# Patient Record
Sex: MALE | Race: WHITE | URBAN - METROPOLITAN AREA
[De-identification: names, ages, dates, MRNs, and addresses within clinical notes are randomized per-mention and may not be internally consistent; named-entity substitution may affect disease eponyms.]

---

## 2018-01-22 ENCOUNTER — MEDICAL CORRESPONDENCE (OUTPATIENT)
Dept: HEALTH INFORMATION MANAGEMENT | Facility: CLINIC | Age: 23
End: 2018-01-22

## 2018-01-22 ENCOUNTER — TRANSFERRED RECORDS (OUTPATIENT)
Dept: HEALTH INFORMATION MANAGEMENT | Facility: CLINIC | Age: 23
End: 2018-01-22

## 2018-01-24 DIAGNOSIS — J45.909 ASTHMA: Primary | ICD-10-CM

## 2018-02-22 NOTE — TELEPHONE ENCOUNTER
APPT INFO    Date /Time: 3/1/18, 10:10am    Reason for Appt: Asthma and cough   Ref Provider/Clinic: PERLA GUADARRAMA   Are there internal records? Yes/No?  IF YES, list clinic names: No    Are there outside records? Yes/No? [facility]- records scanned into epic.   Patient Contact (Y/N) & Call Details: No, referred    Action:

## 2018-02-25 ENCOUNTER — HEALTH MAINTENANCE LETTER (OUTPATIENT)
Age: 23
End: 2018-02-25

## 2018-02-26 ASSESSMENT — ENCOUNTER SYMPTOMS
WHEEZING: 1
SNORES LOUDLY: 0
SHORTNESS OF BREATH: 1
POSTURAL DYSPNEA: 1
COUGH DISTURBING SLEEP: 1
SPUTUM PRODUCTION: 1
DYSPNEA ON EXERTION: 1
COUGH: 1
HEMOPTYSIS: 0

## 2018-03-01 ENCOUNTER — PRE VISIT (OUTPATIENT)
Dept: PULMONOLOGY | Facility: CLINIC | Age: 23
End: 2018-03-01

## 2018-03-01 ENCOUNTER — OFFICE VISIT (OUTPATIENT)
Dept: PULMONOLOGY | Facility: CLINIC | Age: 23
End: 2018-03-01
Payer: COMMERCIAL

## 2018-03-01 ENCOUNTER — RADIANT APPOINTMENT (OUTPATIENT)
Dept: GENERAL RADIOLOGY | Facility: CLINIC | Age: 23
End: 2018-03-01
Payer: COMMERCIAL

## 2018-03-01 VITALS
DIASTOLIC BLOOD PRESSURE: 78 MMHG | HEIGHT: 73 IN | WEIGHT: 179 LBS | HEART RATE: 65 BPM | BODY MASS INDEX: 23.72 KG/M2 | RESPIRATION RATE: 18 BRPM | SYSTOLIC BLOOD PRESSURE: 135 MMHG | OXYGEN SATURATION: 98 %

## 2018-03-01 DIAGNOSIS — J45.909 ASTHMA: ICD-10-CM

## 2018-03-01 DIAGNOSIS — R05.9 COUGH: Primary | ICD-10-CM

## 2018-03-01 PROCEDURE — G0463 HOSPITAL OUTPT CLINIC VISIT: HCPCS | Mod: ZF

## 2018-03-01 RX ORDER — AZITHROMYCIN 250 MG/1
250 TABLET, FILM COATED ORAL DAILY
Qty: 6 TABLET | Refills: 0 | Status: SHIPPED | OUTPATIENT
Start: 2018-03-01 | End: 2020-09-03

## 2018-03-01 RX ORDER — CETIRIZINE HYDROCHLORIDE 10 MG/1
10 TABLET ORAL DAILY
COMMUNITY

## 2018-03-01 RX ORDER — HYDROXYZINE HYDROCHLORIDE 10 MG/5ML
4 SYRUP ORAL EVERY 6 HOURS PRN
Qty: 60 TABLET | Refills: 0 | Status: SHIPPED | OUTPATIENT
Start: 2018-03-01

## 2018-03-01 ASSESSMENT — PAIN SCALES - GENERAL: PAINLEVEL: NO PAIN (0)

## 2018-03-01 NOTE — PROGRESS NOTES
[facility]  Pulmonary Medicine  Visit Clinic Note  March 1, 2018         ASSESSMENT & PLAN       Cough: I suspect that Mr. Diop's cough is related to a post viral bronchitis.  It certainly does not seem to be related to asthma.  He is failed treatment with Advair, prednisone, and his as needed albuterol inhaler.  Additionally his spirometry in the setting of having symptoms does not show any signs of airflow obstruction.    I suspect that time is going to be the best remedy for his cough.  I understand this is hard to bear, especially with the symptom burden that he is experiencing right now.  I did try to reassure him that I do not see any significant red flags that represents dangerous pathology.    In the setting, there are not great evidence-based algorithms as far as how to treat cough.  Sometimes it can be success with the first generation antihistamine such as chlorpheniramine.  I will start treatment with this at 4 mg.  He can take a dose at night, and if he does not find that it is too sedating he can take it during the day as well.  He can give us about a 1 week trial period to see if this helps out with his symptoms.  If it does not help, he could stop this medication.  I have also given him another 5 day course of azithromycin.  He can take this if the chlorpheniramine fails.  The purpose would be to use anti-inflammatory properties and see if it can help with any of his symptoms.    If these above 2 measures do not help, then we can give a trial of gabapentin    Joseph Borja MD     I spent a total of 60 minutes face-to-face with Percy Diop during today's office visit.  Over 50% of this time was spent counseling the patient and/or coordinating care regarding his cough, possible reasons for it, and potential therapeutic avenues.  See note for details.         Today's visit note:     Chief Complaint: Percy Diop is a 22 year old year old male who is being seen for Consult  (Asthma and cough)      HISTORY OF PRESENT ILLNESS:  This is a 22-year-old male with a history of asthma who is presenting to the pulmonary clinic today for evaluation of symptoms after a viral infection.     he gets sick in the second week of January with a viral infection.  This consisted of sore throat chest congestion and cough.  He is now left with some prolonged symptoms that he has not been able to shake.  He has been to see his primary care physician has tried multiple different medications to no avail.  These medications include 2 rounds of antibiotics.  One was azithromycin and the other one was Keflex.  He is also been put on Advair twice a day dosing, and had a trial of prednisone.  None of these medications have helped.    He has had these symptoms on a yearly basis now, always coming after a viral infection.  However in the past the symptoms seem to hernan after taking antibiotics.  He also sometimes gets these symptoms right around Thanksgiving time.  The symptoms that he is experiencing right now as a sensation of shortness of breath, where he just feels he cannot take a deep enough breath to fill his lungs up with air.  He also feels a warm sensation in his lungs and has a dry cough.  He always feels on the verge of an asthma attack.    He states he was diagnosed with asthma since he was a little kid.  He went into remission where he did not need any controlling medication and rarely used a rescue inhaler.  Prior to this recent illness he was essentially just using an albuterol inhaler prior to exercise, because that is the only time that he would get symptoms.  He been on Advair in the past, however the co-pays became too high and so then he tried arnuity ellipta.  However he stopped this medication because it was not helping out with any of his symptoms.    Talking seems to exacerbate his symptoms of cough and shortness of breath.  The only thing that is helped his taking cough drops.              "Past Medical and Surgical History:     Past Medical History:   Diagnosis Date     Asthma      Past Surgical History:   Procedure Laterality Date     TONSILLECTOMY             Family History:     Family History   Problem Relation Age of Onset     Coronary Artery Disease Maternal Grandfather               Social History:     Social History     Social History     Marital status: Single     Spouse name: N/A     Number of children: N/A     Years of education: N/A     Occupational History     Not on file.     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             Medications:     Current Outpatient Prescriptions   Medication     fluticasone-salmeterol (ADVAIR DISKUS) 250-50 MCG/DOSE diskus inhaler     Albuterol Sulfate (PROAIR HFA IN)     cetirizine (ZYRTEC) 10 MG tablet     No current facility-administered medications for this visit.             Review of Systems:        Answers for HPI/ROS submitted by the patient on 2/26/2018   General Symptoms: No  Skin Symptoms: No  HENT Symptoms: No  EYE SYMPTOMS: No  HEART SYMPTOMS: No  LUNG SYMPTOMS: Yes  INTESTINAL SYMPTOMS: No  URINARY SYMPTOMS: No  REPRODUCTIVE SYMPTOMS: No  SKELETAL SYMPTOMS: No  BLOOD SYMPTOMS: No  NERVOUS SYSTEM SYMPTOMS: No  MENTAL HEALTH SYMPTOMS: No  Cough: Yes  Sputum or phlegm: Yes  Coughing up blood: No  Difficulty breating or shortness of breath: Yes  Snoring: No  Wheezing: Yes  Difficulty breathing on exertion: Yes  Nighttime Cough: Yes  Difficulty breathing when lying flat: Yes        PHYSICAL EXAM:  /78  Pulse 65  Resp 18  Ht 1.854 m (6' 1\")  Wt 81.2 kg (179 lb)  SpO2 98%  BMI 23.62 kg/m2     General: Well developed, well nourished, anxious  Eyes: Anicteric  Nose: Nasal mucosa with no edema " or hyperemia.  No polyps  Ears: Hearing grossly normal  Mouth: Oral mucosa is moist, without any lesions. No oropharyngeal exudate.  Neck: supple, no thyromegaly  Lymphatics: No cervical or supraclavicular nodes  Respiratory: Good air movement. No crackles. No rhonchi. No wheezes  Cardiac: RRR, normal S1, S2. No murmurs. No JVD  Abdomen: Soft, NT/ND  Musculoskeletal: Extremities normal. No clubbing. No cyanosis. No edema.  Skin: No rash on limited exam  Neuro: Normal mentation. Normal speech.  Psych:Normal affect           Data:   All laboratory and imaging data reviewed.      PFT:   Pulmonary function testing: He was not able to exhale for a full 6 seconds.  However that said, his spirometry has values of the peers supranormal for his demographic.  His FEV1-FVC ratio is 97%.  His FEV1 is 5.84 L which is 115% predicted, and his FVC is 6.02 L which is 100% predicted.  His total lung capacity 7.46 L which is 97% predicted.  His DLCO is 82% predicted.    Based on these results, it does not appear that he has airflow obstruction.  I do not imagine that if his vital capacity was a little bit higher with more seconds of exhalation it would get to a point where it meets criteria for airflow obstruction.    PFT Interpretation:  Normal spirometry.  Valid Maneuver    CXR: I have independently reviewed the chest x-ray and agree with the radiologist results below:  Findings: PA and lateral views of the chest. Cardiac size within  normal limits. Pulmonary vasculature is distinct. No pleural effusion  or pneumothorax. No focal pulmonary opacities. No acute bony  abnormalities. The visualized upper abdomen appears unremarkable.         Impression:   No acute cardiopulmonary abnormalities.    Recent Results (from the past 168 hour(s))   General PFT Lab (Please always keep checked)    Collection Time: 03/01/18  8:41 AM   Result Value Ref Range    FVC-Pred 6.01 L    FVC-Pre 6.02 L    FVC-%Pred-Pre 100 %    FEV1-Pre 5.84 L     FEV1-%Pred-Pre 115 %    FEV1FVC-Pred 84 %    FEV1FVC-Pre 97 %    FEFMax-Pred 10.69 L/sec    FEFMax-Pre 11.54 L/sec    FEFMax-%Pred-Pre 107 %    FEF2575-Pred 5.34 L/sec    FEF2575-Pre 7.58 L/sec    RGA4399-%Pred-Pre 141 %    ExpTime-Pre 1.25 sec    FIFMax-Pre 7.61 L/sec    VC-Pred 6.32 L    VC-Pre 6.10 L    VC-%Pred-Pre 96 %    IC-Pred 4.17 L    IC-Pre 3.59 L    IC-%Pred-Pre 86 %    ERV-Pred 2.15 L    ERV-Pre 2.52 L    ERV-%Pred-Pre 117 %    FEV1FEV6-Pred 84 %    FEV1FEV6-Pre 96 %    FRCPleth-Pred 3.44 L    FRCPleth-Pre 3.87 L    FRCPleth-%Pred-Pre 112 %    RVPleth-Pred 1.73 L    RVPleth-Pre 1.36 L    RVPleth-%Pred-Pre 78 %    TLCPleth-Pred 7.63 L    TLCPleth-Pre 7.46 L    TLCPleth-%Pred-Pre 97 %    DLCOunc-Pred 38.35 ml/min/mmHg    DLCOunc-Pre 31.75 ml/min/mmHg    DLCOunc-%Pred-Pre 82 %    VA-Pre 6.87 L    VA-%Pred-Pre 94 %    FEV1SVC-Pred 80 %    FEV1SVC-Pre 96 %

## 2018-03-01 NOTE — MR AVS SNAPSHOT
"              After Visit Summary   3/1/2018    Percy Diop    MRN: [id number]           Patient Information     Date Of Birth          1995        Visit Information        Provider Department      3/1/2018 10:10 AM Thiago Borja MD [facility]        Today's Diagnoses     Cough    -  1       Follow-ups after your visit        Who to contact     If you have questions or need follow up information about today's clinic visit or your schedule please contact [facility] directly at [phone].  Normal or non-critical lab and imaging results will be communicated to you by [employer], letter or phone within 4 business days after the clinic has received the results. If you do not hear from us within 7 days, please contact the clinic through [employer] or phone. If you have a critical or abnormal lab result, we will notify you by phone as soon as possible.  Submit refill requests through [employer] or call your pharmacy and they will forward the refill request to us. Please allow 3 business days for your refill to be completed.          Additional Information About Your Visit        MyChart Information     [employer] gives you secure access to your electronic health record. If you see a primary care provider, you can also send messages to your care team and make appointments. If you have questions, please call your primary care clinic.  If you do not have a primary care provider, please call [phone] and they will assist you.        Care EveryWhere ID     This is your Care EveryWhere ID. This could be used by other organizations to access your [place] medical records  [id number]        Your Vitals Were     Pulse Respirations Height Pulse Oximetry BMI (Body Mass Index)       65 18 1.854 m (6' 1\") 98% 23.62 kg/m2        Blood Pressure from Last 3 Encounters:   03/01/18 135/78    Weight from Last 3 Encounters:   03/01/18 81.2 kg (179 lb)            "   Today, you had the following     No orders found for display         Today's Medication Changes          These changes are accurate as of 3/1/18 10:33 AM.  If you have any questions, ask your nurse or doctor.               Start taking these medicines.        Dose/Directions    azithromycin 250 MG tablet   Commonly known as:  ZITHROMAX   Used for:  Cough   Started by:  Thiago Borja MD        Dose:  250 mg   Take 1 tablet (250 mg) by mouth daily   Quantity:  6 tablet   Refills:  0       chlorpheniramine 4 MG tablet   Commonly known as:  CHLOR-TRIMETON   Used for:  Cough   Started by:  Thiago Borja MD        Dose:  4 mg   Take 1 tablet (4 mg) by mouth every 6 hours as needed for other (cough)   Quantity:  60 tablet   Refills:  0            Where to get your medicines      These medications were sent to [place] [address]     Phone:  [phone]     azithromycin 250 MG tablet    chlorpheniramine 4 MG tablet                Primary Care Provider Fax #    [facility] [phone]       [address] [place] [id number]        Equal Access to Services     LARRY ALFREDO : Hadshira Amaro, walilibethda kenneth, qaybta kaalregina rene, mayco rose. So [facility] [phone].    ATENCIÓN: Si habla español, tiene a beck disposición servicios gratuitos de asistencia lingüística. Gina al [phone].    We comply with applicable federal civil rights laws and [place] laws. We do not discriminate on the basis of race, color, national origin, age, disability, sex, sexual orientation, or gender identity.            Thank you!     Thank you for choosing [facility]  for your care. Our goal is always to provide you with excellent care. Hearing back from our patients is one way we can continue to improve our services. Please take a few  minutes to complete the written survey that you may receive in the mail after your visit with us. Thank you!             Your Updated Medication List - Protect others around you: Learn how to safely use, store and throw away your medicines at www.disposemymeds.org.          This list is accurate as of 3/1/18 10:33 AM.  Always use your most recent med list.                   Brand Name Dispense Instructions for use Diagnosis    ADVAIR DISKUS 250-50 MCG/DOSE diskus inhaler   Generic drug:  fluticasone-salmeterol           azithromycin 250 MG tablet    ZITHROMAX    6 tablet    Take 1 tablet (250 mg) by mouth daily    Cough       cetirizine 10 MG tablet    zyrTEC     Take 10 mg by mouth daily        chlorpheniramine 4 MG tablet    CHLOR-TRIMETON    60 tablet    Take 1 tablet (4 mg) by mouth every 6 hours as needed for other (cough)    Cough       PROAIR HFA IN

## 2018-03-01 NOTE — LETTER
3/1/2018       RE: Percy Diop  [address]    [place] [address]     Dear Colleague,    Thank you for referring your patient, Percy Diop, to the [facility] at [facility]. Please see a copy of my visit note below.    [facility]  Pulmonary Medicine  Visit Clinic Note  March 1, 2018         ASSESSMENT & PLAN       Cough: I suspect that Mr. Diop's cough is related to a post viral bronchitis.  It certainly does not seem to be related to asthma.  He is failed treatment with Advair, prednisone, and his as needed albuterol inhaler.  Additionally his spirometry in the setting of having symptoms does not show any signs of airflow obstruction.    I suspect that time is going to be the best remedy for his cough.  I understand this is hard to bear, especially with the symptom burden that he is experiencing right now.  I did try to reassure him that I do not see any significant red flags that represents dangerous pathology.    In the setting, there are not great evidence-based algorithms as far as how to treat cough.  Sometimes it can be success with the first generation antihistamine such as chlorpheniramine.  I will start treatment with this at 4 mg.  He can take a dose at night, and if he does not find that it is too sedating he can take it during the day as well.  He can give us about a 1 week trial period to see if this helps out with his symptoms.  If it does not help, he could stop this medication.  I have also given him another 5 day course of azithromycin.  He can take this if the chlorpheniramine fails.  The purpose would be to use anti-inflammatory properties and see if it can help with any of his symptoms.    If these above 2 measures do not help, then we can give a trial of gabapentin    Joseph Borja MD     I spent a total of 60 minutes face-to-face with Percy Diop during today's office visit.  Over  50% of this time was spent counseling the patient and/or coordinating care regarding his cough, possible reasons for it, and potential therapeutic avenues.  See note for details.         Today's visit note:     Chief Complaint: Percy Diop is a 22 year old year old male who is being seen for Consult (Asthma and cough)      HISTORY OF PRESENT ILLNESS:  This is a 22-year-old male with a history of asthma who is presenting to the pulmonary clinic today for evaluation of symptoms after a viral infection.     he gets sick in the second week of January with a viral infection.  This consisted of sore throat chest congestion and cough.  He is now left with some prolonged symptoms that he has not been able to shake.  He has been to see his primary care physician has tried multiple different medications to no avail.  These medications include 2 rounds of antibiotics.  One was azithromycin and the other one was Keflex.  He is also been put on Advair twice a day dosing, and had a trial of prednisone.  None of these medications have helped.    He has had these symptoms on a yearly basis now, always coming after a viral infection.  However in the past the symptoms seem to hernan after taking antibiotics.  He also sometimes gets these symptoms right around Thanksgiving time.  The symptoms that he is experiencing right now as a sensation of shortness of breath, where he just feels he cannot take a deep enough breath to fill his lungs up with air.  He also feels a warm sensation in his lungs and has a dry cough.  He always feels on the verge of an asthma attack.    He states he was diagnosed with asthma since he was a little kid.  He went into remission where he did not need any controlling medication and rarely used a rescue inhaler.  Prior to this recent illness he was essentially just using an albuterol inhaler prior to exercise, because that is the only time that he would get symptoms.  He been on Advair in the past, however  the co-pays became too high and so then he tried arnuity ellipta.  However he stopped this medication because it was not helping out with any of his symptoms.    Talking seems to exacerbate his symptoms of cough and shortness of breath.  The only thing that is helped his taking cough drops.             Past Medical and Surgical History:     Past Medical History:   Diagnosis Date     Asthma      Past Surgical History:   Procedure Laterality Date     TONSILLECTOMY             Family History:     Family History   Problem Relation Age of Onset     Coronary Artery Disease Maternal Grandfather               Social History:     Social History     Social History     Marital status: Single     Spouse name: N/A     Number of children: N/A     Years of education: N/A     Occupational History     Not on file.     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             Medications:     Current Outpatient Prescriptions   Medication     fluticasone-salmeterol (ADVAIR DISKUS) 250-50 MCG/DOSE diskus inhaler     Albuterol Sulfate (PROAIR HFA IN)     cetirizine (ZYRTEC) 10 MG tablet     No current facility-administered medications for this visit.             Review of Systems:        Answers for HPI/ROS submitted by the patient on 2/26/2018   General Symptoms: No  Skin Symptoms: No  HENT Symptoms: No  EYE SYMPTOMS: No  HEART SYMPTOMS: No  LUNG SYMPTOMS: Yes  INTESTINAL SYMPTOMS: No  URINARY SYMPTOMS: No  REPRODUCTIVE SYMPTOMS: No  SKELETAL SYMPTOMS: No  BLOOD SYMPTOMS: No  NERVOUS SYSTEM SYMPTOMS: No  MENTAL HEALTH SYMPTOMS: No  Cough: Yes  Sputum or phlegm: Yes  Coughing up blood: No  Difficulty breating or shortness of breath: Yes  Snoring: No  Wheezing: Yes  Difficulty breathing on  "exertion: Yes  Nighttime Cough: Yes  Difficulty breathing when lying flat: Yes        PHYSICAL EXAM:  /78  Pulse 65  Resp 18  Ht 1.854 m (6' 1\")  Wt 81.2 kg (179 lb)  SpO2 98%  BMI 23.62 kg/m2     General: Well developed, well nourished, anxious  Eyes: Anicteric  Nose: Nasal mucosa with no edema or hyperemia.  No polyps  Ears: Hearing grossly normal  Mouth: Oral mucosa is moist, without any lesions. No oropharyngeal exudate.  Neck: supple, no thyromegaly  Lymphatics: No cervical or supraclavicular nodes  Respiratory: Good air movement. No crackles. No rhonchi. No wheezes  Cardiac: RRR, normal S1, S2. No murmurs. No JVD  Abdomen: Soft, NT/ND  Musculoskeletal: Extremities normal. No clubbing. No cyanosis. No edema.  Skin: No rash on limited exam  Neuro: Normal mentation. Normal speech.  Psych:Normal affect           Data:   All laboratory and imaging data reviewed.      PFT:   Pulmonary function testing: He was not able to exhale for a full 6 seconds.  However that said, his spirometry has values of the peers supranormal for his demographic.  His FEV1-FVC ratio is 97%.  His FEV1 is 5.84 L which is 115% predicted, and his FVC is 6.02 L which is 100% predicted.  His total lung capacity 7.46 L which is 97% predicted.  His DLCO is 82% predicted.    Based on these results, it does not appear that he has airflow obstruction.  I do not imagine that if his vital capacity was a little bit higher with more seconds of exhalation it would get to a point where it meets criteria for airflow obstruction.    PFT Interpretation:  Normal spirometry.  Valid Maneuver    CXR: I have independently reviewed the chest x-ray and agree with the radiologist results below:  Findings: PA and lateral views of the chest. Cardiac size within  normal limits. Pulmonary vasculature is distinct. No pleural effusion  or pneumothorax. No focal pulmonary opacities. No acute bony  abnormalities. The visualized upper abdomen appears " unremarkable.         Impression:   No acute cardiopulmonary abnormalities.    Recent Results (from the past 168 hour(s))   General PFT Lab (Please always keep checked)    Collection Time: 03/01/18  8:41 AM   Result Value Ref Range    FVC-Pred 6.01 L    FVC-Pre 6.02 L    FVC-%Pred-Pre 100 %    FEV1-Pre 5.84 L    FEV1-%Pred-Pre 115 %    FEV1FVC-Pred 84 %    FEV1FVC-Pre 97 %    FEFMax-Pred 10.69 L/sec    FEFMax-Pre 11.54 L/sec    FEFMax-%Pred-Pre 107 %    FEF2575-Pred 5.34 L/sec    FEF2575-Pre 7.58 L/sec    [id number]-%Pred-Pre 141 %    ExpTime-Pre 1.25 sec    FIFMax-Pre 7.61 L/sec    VC-Pred 6.32 L    VC-Pre 6.10 L    VC-%Pred-Pre 96 %    IC-Pred 4.17 L    IC-Pre 3.59 L    IC-%Pred-Pre 86 %    ERV-Pred 2.15 L    ERV-Pre 2.52 L    ERV-%Pred-Pre 117 %    FEV1FEV6-Pred 84 %    FEV1FEV6-Pre 96 %    FRCPleth-Pred 3.44 L    FRCPleth-Pre 3.87 L    FRCPleth-%Pred-Pre 112 %    RVPleth-Pred 1.73 L    RVPleth-Pre 1.36 L    RVPleth-%Pred-Pre 78 %    TLCPleth-Pred 7.63 L    TLCPleth-Pre 7.46 L    TLCPleth-%Pred-Pre 97 %    DLCOunc-Pred 38.35 ml/min/mmHg    DLCOunc-Pre 31.75 ml/min/mmHg    DLCOunc-%Pred-Pre 82 %    VA-Pre 6.87 L    VA-%Pred-Pre 94 %    FEV1SVC-Pred 80 %    FEV1SVC-Pre 96 %     Sincerely,    Thiago Borja MD

## 2018-03-07 LAB
DLCOUNC-%PRED-PRE: 82 %
DLCOUNC-PRE: 31.75 ML/MIN/MMHG
DLCOUNC-PRED: 38.35 ML/MIN/MMHG
ERV-%PRED-PRE: 117 %
ERV-PRE: 2.52 L
ERV-PRED: 2.15 L
EXPTIME-PRE: 1.25 SEC
FEF2575-%PRED-PRE: 141 %
FEF2575-PRE: 7.58 L/SEC
FEF2575-PRED: 5.34 L/SEC
FEFMAX-%PRED-PRE: 107 %
FEFMAX-PRE: 11.54 L/SEC
FEFMAX-PRED: 10.69 L/SEC
FEV1-%PRED-PRE: 115 %
FEV1-PRE: 5.84 L
FEV1FEV6-PRE: 96 %
FEV1FEV6-PRED: 84 %
FEV1FVC-PRE: 97 %
FEV1FVC-PRED: 84 %
FEV1SVC-PRE: 96 %
FEV1SVC-PRED: 80 %
FIFMAX-PRE: 7.61 L/SEC
FRCPLETH-%PRED-PRE: 112 %
FRCPLETH-PRE: 3.87 L
FRCPLETH-PRED: 3.44 L
FVC-%PRED-PRE: 100 %
FVC-PRE: 6.02 L
FVC-PRED: 6.01 L
IC-%PRED-PRE: 86 %
IC-PRE: 3.59 L
IC-PRED: 4.17 L
RVPLETH-%PRED-PRE: 78 %
RVPLETH-PRE: 1.36 L
RVPLETH-PRED: 1.73 L
TLCPLETH-%PRED-PRE: 97 %
TLCPLETH-PRE: 7.46 L
TLCPLETH-PRED: 7.63 L
VA-%PRED-PRE: 94 %
VA-PRE: 6.87 L
VC-%PRED-PRE: 96 %
VC-PRE: 6.1 L
VC-PRED: 6.32 L

## 2020-03-11 ENCOUNTER — HEALTH MAINTENANCE LETTER (OUTPATIENT)
Age: 25
End: 2020-03-11

## 2020-09-03 ENCOUNTER — VIRTUAL VISIT (OUTPATIENT)
Dept: PULMONOLOGY | Facility: CLINIC | Age: 25
End: 2020-09-03
Payer: COMMERCIAL

## 2020-09-03 DIAGNOSIS — J30.2 SEASONAL ALLERGIC RHINITIS, UNSPECIFIED TRIGGER: Primary | ICD-10-CM

## 2020-09-03 NOTE — PROGRESS NOTES
"Percy Diop is a 24 year old male who is being evaluated via a billable video visit.      The patient has been notified of following:     \"This video visit will be conducted via a call between you and your physician/provider. We have found that certain health care needs can be provided without the need for an in-person physical exam.  This service lets us provide the care you need with a video conversation.  If a prescription is necessary we can send it directly to your pharmacy.  If lab work is needed we can place an order for that and you can then stop by our lab to have the test done at a later time.    Video visits are billed at different rates depending on your insurance coverage.  Please reach out to your insurance provider with any questions.    If during the course of the call the physician/provider feels a video visit is not appropriate, you will not be charged for this service.\"    Patient has given verbal consent for Video visit? Yes  How would you like to obtain your AVS? Chandahart  If you are dropped from the video visit, the video invite should be resent to: Other e-mail: Lynn  Will anyone else be joining your video visit? No      [facility]  Pulmonary Medicine  Visit Clinic Note  September 3, 2020         ASSESSMENT & PLAN       Seasonal allergies: It seems that once a year he develops upper respiratory tract symptoms.  This usually happens in the fall.  It is associated with shortness of breath at times, which does bring the possibility of asthma and to play.  However we have not been able to demonstrate any bronchial hyperresponsiveness.  Perhaps we need to check his spirometry while he is actually symptomatic in order to formally diagnose this.  That said, he does not have significant symptoms throughout the rest of the year.  I think it is okay to have him take some remedy for allergic rhinitis such as an antihistamine, or nasal rinse when the symptoms arise.    He could " return to clinic on an as-needed basis.    Joseph Borja MD          Today's visit note:     Chief Complaint: Percy Diop is a 24 year old year old male who is being seen for Video Visit (asthma)      HISTORY OF PRESENT ILLNESS:  This is a 24-year-old male who presents for follow-up on some of his upper respiratory tract symptoms.    Every year in the fall, he starts to get upper respiratory tract symptoms such as nasal congestion cough, vocal hoarseness, with associated shortness of breath.  I had previously seen him for an evaluation of possible asthma.  At that time his pulmonary function testing was normal.  We prescribed an antihistamine which seemed to work that fall, but did not work the next year.  Instead he took some saline nasal rinse which ended up helping out with his symptoms.  So far this fall, he has not had any significant upper respiratory tract symptoms.  He also denies any chest pain, wheeze, shortness of breath.             Past Medical and Surgical History:     Past Medical History:   Diagnosis Date     Asthma      Past Surgical History:   Procedure Laterality Date     TONSILLECTOMY             Family History:     Family History   Problem Relation Age of Onset     Coronary Artery Disease Maternal Grandfather               Social History:     Social History     Socioeconomic History     Marital status: Single     Spouse name: Not on file     Number of children: Not on file     Years of education: Not on file     Highest education level: Not on file   Occupational History     Not on file   Social Needs     Financial resource strain: Not on file     Food insecurity     Worry: Not on file     Inability: Not on file     Transportation needs     Medical: Not on file     Non-medical: Not on file   Tobacco Use     Smoking status: Never Smoker     Smokeless tobacco: Never Used   Substance and Sexual Activity     Alcohol use: Yes     Comment: Rarely     Drug use: No     Sexual activity: Not on file    Lifestyle     Physical activity     Days per week: Not on file     Minutes per session: Not on file     Stress: Not on file   Relationships     Social connections     Talks on phone: Not on file     Gets together: Not on file     Attends Catholic service: Not on file     Active member of club or organization: Not on file     Attends meetings of clubs or organizations: Not on file     Relationship status: Not on file     Intimate partner violence     Fear of current or ex partner: Not on file     Emotionally abused: Not on file     Physically abused: Not on file     Forced sexual activity: Not on file   Other Topics Concern     Parent/sibling w/ CABG, MI or angioplasty before 65F 55M? Not Asked   Social History Narrative    Poly science    Joseph in college.             Medications:     Current Outpatient Medications   Medication     Albuterol Sulfate (PROAIR HFA IN)     cetirizine (ZYRTEC) 10 MG tablet     chlorpheniramine (CHLOR-TRIMETON) 4 MG tablet     No current facility-administered medications for this visit.             Review of Systems:       A complete review of systems was otherwise negative except as noted in the HPI.      PHYSICAL EXAM:  There were no vitals taken for this visit.     General: Appears healthy, normal speech pattern.    This visit was a video visit           Data:   All laboratory and imaging data reviewed.      PFT:   Pulmonary function dated March 1, 2018.  FEV1/FVC ratio 0.97.  FVC is 6.02 L which is 100% predicted, and the FEV1 is 5.4 L which is 115% predicted.  Total lung capacity is 97% predicted diffusion capacity is 82% predicted.      PFT Interpretation:  No airflow obstruction.  Normal lung volumes.  Normal diffusion capacity.  The maneuver was not completely valid, as he only exhaled for 2 seconds.  However I do not suspect this would substantially change the results.      CXR: I personally reviewed the chest x-ray images and agree with radiologist interpretation  below:  Findings: PA and lateral views of the chest. Cardiac size within  normal limits. Pulmonary vasculature is distinct. No pleural effusion  or pneumothorax. No focal pulmonary opacities. No acute bony  abnormalities. The visualized upper abdomen appears unremarkable.                                                                      Impression:   No acute cardiopulmonary abnormalities.                                    Video-Visit Details    Type of service:  Video Visit    Video Start Time: 2:06 PM  Video End Time: 2:15 pm    Originating Location (pt. Location): Home    Distant Location (provider location):  [facility]     Platform used for Video Visit: Melody Borja MD

## 2020-09-03 NOTE — LETTER
"    9/3/2020         RE: Percy Diop  [address] [place] [address]        Dear Colleague,    Thank you for referring your patient, Percy Diop, to the [facility]. Please see a copy of my visit note below.    Percy Diop is a 24 year old male who is being evaluated via a billable video visit.      The patient has been notified of following:     \"This video visit will be conducted via a call between you and your physician/provider. We have found that certain health care needs can be provided without the need for an in-person physical exam.  This service lets us provide the care you need with a video conversation.  If a prescription is necessary we can send it directly to your pharmacy.  If lab work is needed we can place an order for that and you can then stop by our lab to have the test done at a later time.    Video visits are billed at different rates depending on your insurance coverage.  Please reach out to your insurance provider with any questions.    If during the course of the call the physician/provider feels a video visit is not appropriate, you will not be charged for this service.\"    Patient has given verbal consent for Video visit? Yes  How would you like to obtain your AVS? MyChart  If you are dropped from the video visit, the video invite should be resent to: Other e-mail: Lynn  Will anyone else be joining your video visit? No      [facility]  Pulmonary Medicine  Visit Clinic Note  September 3, 2020         ASSESSMENT & PLAN       Seasonal allergies: It seems that once a year he develops upper respiratory tract symptoms.  This usually happens in the fall.  It is associated with shortness of breath at times, which does bring the possibility of asthma and to play.  However we have not been able to demonstrate any bronchial hyperresponsiveness.  Perhaps we need to check his spirometry while he is actually symptomatic in order to " formally diagnose this.  That said, he does not have significant symptoms throughout the rest of the year.  I think it is okay to have him take some remedy for allergic rhinitis such as an antihistamine, or nasal rinse when the symptoms arise.    He could return to clinic on an as-needed basis.    Joseph Borja MD          Today's visit note:     Chief Complaint: Percy Diop is a 24 year old year old male who is being seen for Video Visit (asthma)      HISTORY OF PRESENT ILLNESS:  This is a 24-year-old male who presents for follow-up on some of his upper respiratory tract symptoms.    Every year in the fall, he starts to get upper respiratory tract symptoms such as nasal congestion cough, vocal hoarseness, with associated shortness of breath.  I had previously seen him for an evaluation of possible asthma.  At that time his pulmonary function testing was normal.  We prescribed an antihistamine which seemed to work that fall, but did not work the next year.  Instead he took some saline nasal rinse which ended up helping out with his symptoms.  So far this fall, he has not had any significant upper respiratory tract symptoms.  He also denies any chest pain, wheeze, shortness of breath.             Past Medical and Surgical History:     Past Medical History:   Diagnosis Date     Asthma      Past Surgical History:   Procedure Laterality Date     TONSILLECTOMY             Family History:     Family History   Problem Relation Age of Onset     Coronary Artery Disease Maternal Grandfather               Social History:     Social History     Socioeconomic History     Marital status: Single     Spouse name: Not on file     Number of children: Not on file     Years of education: Not on file     Highest education level: Not on file   Occupational History     Not on file   Social Needs     Financial resource strain: Not on file     Food insecurity     Worry: Not on file     Inability: Not on file     Transportation needs      Medical: Not on file     Non-medical: Not on file   Tobacco Use     Smoking status: Never Smoker     Smokeless tobacco: Never Used   Substance and Sexual Activity     Alcohol use: Yes     Comment: Rarely     Drug use: No     Sexual activity: Not on file   Lifestyle     Physical activity     Days per week: Not on file     Minutes per session: Not on file     Stress: Not on file   Relationships     Social connections     Talks on phone: Not on file     Gets together: Not on file     Attends Restorationist service: Not on file     Active member of club or organization: Not on file     Attends meetings of clubs or organizations: Not on file     Relationship status: Not on file     Intimate partner violence     Fear of current or ex partner: Not on file     Emotionally abused: Not on file     Physically abused: Not on file     Forced sexual activity: Not on file   Other Topics Concern     Parent/sibling w/ CABG, MI or angioplasty before 65F 55M? Not Asked   Social History Narrative    Poly science    Joseph in college.             Medications:     Current Outpatient Medications   Medication     Albuterol Sulfate (PROAIR HFA IN)     cetirizine (ZYRTEC) 10 MG tablet     chlorpheniramine (CHLOR-TRIMETON) 4 MG tablet     No current facility-administered medications for this visit.             Review of Systems:       A complete review of systems was otherwise negative except as noted in the HPI.      PHYSICAL EXAM:  There were no vitals taken for this visit.     General: Appears healthy, normal speech pattern.    This visit was a video visit           Data:   All laboratory and imaging data reviewed.      PFT:   Pulmonary function dated March 1, 2018.  FEV1/FVC ratio 0.97.  FVC is 6.02 L which is 100% predicted, and the FEV1 is 5.4 L which is 115% predicted.  Total lung capacity is 97% predicted diffusion capacity is 82% predicted.      PFT Interpretation:  No airflow obstruction.  Normal lung volumes.  Normal diffusion  capacity.  The maneuver was not completely valid, as he only exhaled for 2 seconds.  However I do not suspect this would substantially change the results.      CXR: I personally reviewed the chest x-ray images and agree with radiologist interpretation below:  Findings: PA and lateral views of the chest. Cardiac size within  normal limits. Pulmonary vasculature is distinct. No pleural effusion  or pneumothorax. No focal pulmonary opacities. No acute bony  abnormalities. The visualized upper abdomen appears unremarkable.                                                                      Impression:   No acute cardiopulmonary abnormalities.                                    Video-Visit Details    Type of service:  Video Visit    Video Start Time: 2:06 PM  Video End Time: 2:15 pm    Originating Location (pt. Location): Home    Distant Location (provider location):  [facility]     Platform used for Video Visit: Melody Borja MD          Again, thank you for allowing me to participate in the care of your patient.        Sincerely,        Thiago Borja MD

## 2020-09-04 PROBLEM — J30.2 SEASONAL ALLERGIC RHINITIS, UNSPECIFIED TRIGGER: Status: ACTIVE | Noted: 2020-09-04

## 2021-01-03 ENCOUNTER — HEALTH MAINTENANCE LETTER (OUTPATIENT)
Age: 26
End: 2021-01-03

## 2021-04-25 ENCOUNTER — HEALTH MAINTENANCE LETTER (OUTPATIENT)
Age: 26
End: 2021-04-25

## 2021-10-10 ENCOUNTER — HEALTH MAINTENANCE LETTER (OUTPATIENT)
Age: 26
End: 2021-10-10

## 2022-05-21 ENCOUNTER — HEALTH MAINTENANCE LETTER (OUTPATIENT)
Age: 27
End: 2022-05-21

## 2022-09-17 ENCOUNTER — HEALTH MAINTENANCE LETTER (OUTPATIENT)
Age: 27
End: 2022-09-17

## 2023-06-04 ENCOUNTER — HEALTH MAINTENANCE LETTER (OUTPATIENT)
Age: 28
End: 2023-06-04